# Patient Record
Sex: FEMALE | Race: WHITE | NOT HISPANIC OR LATINO
[De-identification: names, ages, dates, MRNs, and addresses within clinical notes are randomized per-mention and may not be internally consistent; named-entity substitution may affect disease eponyms.]

---

## 2021-08-19 PROBLEM — Z00.00 ENCOUNTER FOR PREVENTIVE HEALTH EXAMINATION: Status: ACTIVE | Noted: 2021-08-19

## 2021-09-01 ENCOUNTER — APPOINTMENT (OUTPATIENT)
Dept: OBGYN | Facility: CLINIC | Age: 26
End: 2021-09-01
Payer: MEDICAID

## 2021-09-01 PROCEDURE — 76830 TRANSVAGINAL US NON-OB: CPT

## 2021-09-01 PROCEDURE — 99204 OFFICE O/P NEW MOD 45 MIN: CPT | Mod: 25

## 2021-10-06 ENCOUNTER — APPOINTMENT (OUTPATIENT)
Dept: OBGYN | Facility: CLINIC | Age: 26
End: 2021-10-06
Payer: MEDICAID

## 2021-10-06 PROCEDURE — 99214 OFFICE O/P EST MOD 30 MIN: CPT | Mod: 25

## 2021-10-06 PROCEDURE — 76830 TRANSVAGINAL US NON-OB: CPT

## 2021-10-06 PROCEDURE — 87490 CHLMYD TRACH DNA DIR PROBE: CPT

## 2021-10-27 ENCOUNTER — APPOINTMENT (OUTPATIENT)
Dept: OBGYN | Facility: CLINIC | Age: 26
End: 2021-10-27
Payer: MEDICAID

## 2021-10-27 PROCEDURE — 99213 OFFICE O/P EST LOW 20 MIN: CPT | Mod: TH

## 2021-11-03 ENCOUNTER — APPOINTMENT (OUTPATIENT)
Dept: OBGYN | Facility: CLINIC | Age: 26
End: 2021-11-03
Payer: MEDICAID

## 2021-11-03 PROCEDURE — 76830 TRANSVAGINAL US NON-OB: CPT

## 2021-11-03 PROCEDURE — 99213 OFFICE O/P EST LOW 20 MIN: CPT | Mod: TH,25

## 2021-11-24 ENCOUNTER — APPOINTMENT (OUTPATIENT)
Dept: OBGYN | Facility: CLINIC | Age: 26
End: 2021-11-24
Payer: MEDICAID

## 2021-11-24 PROCEDURE — 99213 OFFICE O/P EST LOW 20 MIN: CPT | Mod: TH

## 2021-12-22 ENCOUNTER — APPOINTMENT (OUTPATIENT)
Dept: OBGYN | Facility: CLINIC | Age: 26
End: 2021-12-22
Payer: MEDICAID

## 2021-12-22 PROCEDURE — 99213 OFFICE O/P EST LOW 20 MIN: CPT | Mod: TH

## 2022-01-19 ENCOUNTER — APPOINTMENT (OUTPATIENT)
Dept: OBGYN | Facility: CLINIC | Age: 27
End: 2022-01-19
Payer: MEDICAID

## 2022-01-19 PROCEDURE — 99213 OFFICE O/P EST LOW 20 MIN: CPT | Mod: TH

## 2022-02-02 ENCOUNTER — APPOINTMENT (OUTPATIENT)
Dept: OBGYN | Facility: CLINIC | Age: 27
End: 2022-02-02
Payer: MEDICAID

## 2022-02-02 PROCEDURE — 99213 OFFICE O/P EST LOW 20 MIN: CPT | Mod: TH

## 2022-02-16 ENCOUNTER — APPOINTMENT (OUTPATIENT)
Dept: OBGYN | Facility: CLINIC | Age: 27
End: 2022-02-16
Payer: MEDICAID

## 2022-02-16 PROCEDURE — 99213 OFFICE O/P EST LOW 20 MIN: CPT | Mod: TH

## 2022-03-02 ENCOUNTER — APPOINTMENT (OUTPATIENT)
Dept: OBGYN | Facility: CLINIC | Age: 27
End: 2022-03-02
Payer: MEDICAID

## 2022-03-02 PROCEDURE — 99213 OFFICE O/P EST LOW 20 MIN: CPT | Mod: TH

## 2022-03-30 ENCOUNTER — APPOINTMENT (OUTPATIENT)
Dept: OBGYN | Facility: CLINIC | Age: 27
End: 2022-03-30
Payer: MEDICAID

## 2022-03-30 PROCEDURE — 87075 CULTR BACTERIA EXCEPT BLOOD: CPT

## 2022-03-30 PROCEDURE — 99213 OFFICE O/P EST LOW 20 MIN: CPT | Mod: TH

## 2022-04-06 ENCOUNTER — APPOINTMENT (OUTPATIENT)
Dept: OBGYN | Facility: CLINIC | Age: 27
End: 2022-04-06
Payer: MEDICAID

## 2022-04-06 PROCEDURE — 99213 OFFICE O/P EST LOW 20 MIN: CPT | Mod: TH

## 2022-04-13 ENCOUNTER — APPOINTMENT (OUTPATIENT)
Dept: OBGYN | Facility: CLINIC | Age: 27
End: 2022-04-13
Payer: MEDICAID

## 2022-04-13 PROCEDURE — 99213 OFFICE O/P EST LOW 20 MIN: CPT | Mod: TH

## 2022-04-27 ENCOUNTER — APPOINTMENT (OUTPATIENT)
Dept: OBGYN | Facility: CLINIC | Age: 27
End: 2022-04-27
Payer: MEDICAID

## 2022-04-27 PROCEDURE — 99213 OFFICE O/P EST LOW 20 MIN: CPT | Mod: TH

## 2022-05-04 ENCOUNTER — APPOINTMENT (OUTPATIENT)
Dept: OBGYN | Facility: CLINIC | Age: 27
End: 2022-05-04
Payer: MEDICAID

## 2022-05-04 PROCEDURE — 99213 OFFICE O/P EST LOW 20 MIN: CPT | Mod: TH

## 2022-05-05 ENCOUNTER — INPATIENT (INPATIENT)
Facility: HOSPITAL | Age: 27
LOS: 0 days | Discharge: HOME | End: 2022-05-06
Attending: OBSTETRICS & GYNECOLOGY | Admitting: OBSTETRICS & GYNECOLOGY
Payer: MEDICAID

## 2022-05-05 VITALS
SYSTOLIC BLOOD PRESSURE: 144 MMHG | TEMPERATURE: 98 F | DIASTOLIC BLOOD PRESSURE: 80 MMHG | HEART RATE: 90 BPM | HEIGHT: 63 IN | WEIGHT: 177.03 LBS | RESPIRATION RATE: 18 BRPM

## 2022-05-05 LAB
ABO RH CONFIRMATION: SIGNIFICANT CHANGE UP
BASOPHILS # BLD AUTO: 0.03 K/UL — SIGNIFICANT CHANGE UP (ref 0–0.2)
BASOPHILS # BLD AUTO: 0.05 K/UL — SIGNIFICANT CHANGE UP (ref 0–0.2)
BASOPHILS NFR BLD AUTO: 0.2 % — SIGNIFICANT CHANGE UP (ref 0–1)
BASOPHILS NFR BLD AUTO: 0.3 % — SIGNIFICANT CHANGE UP (ref 0–1)
BLD GP AB SCN SERPL QL: SIGNIFICANT CHANGE UP
EOSINOPHIL # BLD AUTO: 0.03 K/UL — SIGNIFICANT CHANGE UP (ref 0–0.7)
EOSINOPHIL # BLD AUTO: 0.09 K/UL — SIGNIFICANT CHANGE UP (ref 0–0.7)
EOSINOPHIL NFR BLD AUTO: 0.2 % — SIGNIFICANT CHANGE UP (ref 0–8)
EOSINOPHIL NFR BLD AUTO: 0.7 % — SIGNIFICANT CHANGE UP (ref 0–8)
HCT VFR BLD CALC: 39.2 % — SIGNIFICANT CHANGE UP (ref 37–47)
HCT VFR BLD CALC: 41.2 % — SIGNIFICANT CHANGE UP (ref 37–47)
HGB BLD-MCNC: 13 G/DL — SIGNIFICANT CHANGE UP (ref 12–16)
HGB BLD-MCNC: 13.9 G/DL — SIGNIFICANT CHANGE UP (ref 12–16)
HIV 1 & 2 AB SERPL IA.RAPID: SIGNIFICANT CHANGE UP
IMM GRANULOCYTES NFR BLD AUTO: 0.5 % — HIGH (ref 0.1–0.3)
IMM GRANULOCYTES NFR BLD AUTO: 0.6 % — HIGH (ref 0.1–0.3)
LYMPHOCYTES # BLD AUTO: 17.2 % — LOW (ref 20.5–51.1)
LYMPHOCYTES # BLD AUTO: 2.88 K/UL — SIGNIFICANT CHANGE UP (ref 1.2–3.4)
LYMPHOCYTES # BLD AUTO: 2.99 K/UL — SIGNIFICANT CHANGE UP (ref 1.2–3.4)
LYMPHOCYTES # BLD AUTO: 23.7 % — SIGNIFICANT CHANGE UP (ref 20.5–51.1)
MCHC RBC-ENTMCNC: 28.3 PG — SIGNIFICANT CHANGE UP (ref 27–31)
MCHC RBC-ENTMCNC: 28.4 PG — SIGNIFICANT CHANGE UP (ref 27–31)
MCHC RBC-ENTMCNC: 33.2 G/DL — SIGNIFICANT CHANGE UP (ref 32–37)
MCHC RBC-ENTMCNC: 33.7 G/DL — SIGNIFICANT CHANGE UP (ref 32–37)
MCV RBC AUTO: 84.3 FL — SIGNIFICANT CHANGE UP (ref 81–99)
MCV RBC AUTO: 85.4 FL — SIGNIFICANT CHANGE UP (ref 81–99)
MONOCYTES # BLD AUTO: 0.87 K/UL — HIGH (ref 0.1–0.6)
MONOCYTES # BLD AUTO: 1.12 K/UL — HIGH (ref 0.1–0.6)
MONOCYTES NFR BLD AUTO: 6.7 % — SIGNIFICANT CHANGE UP (ref 1.7–9.3)
MONOCYTES NFR BLD AUTO: 6.9 % — SIGNIFICANT CHANGE UP (ref 1.7–9.3)
NEUTROPHILS # BLD AUTO: 12.59 K/UL — HIGH (ref 1.4–6.5)
NEUTROPHILS # BLD AUTO: 8.57 K/UL — HIGH (ref 1.4–6.5)
NEUTROPHILS NFR BLD AUTO: 67.9 % — SIGNIFICANT CHANGE UP (ref 42.2–75.2)
NEUTROPHILS NFR BLD AUTO: 75.1 % — SIGNIFICANT CHANGE UP (ref 42.2–75.2)
NRBC # BLD: 0 /100 WBCS — SIGNIFICANT CHANGE UP (ref 0–0)
NRBC # BLD: 0 /100 WBCS — SIGNIFICANT CHANGE UP (ref 0–0)
PLATELET # BLD AUTO: 167 K/UL — SIGNIFICANT CHANGE UP (ref 130–400)
PLATELET # BLD AUTO: 179 K/UL — SIGNIFICANT CHANGE UP (ref 130–400)
PRENATAL SYPHILIS TEST: SIGNIFICANT CHANGE UP
RBC # BLD: 4.59 M/UL — SIGNIFICANT CHANGE UP (ref 4.2–5.4)
RBC # BLD: 4.89 M/UL — SIGNIFICANT CHANGE UP (ref 4.2–5.4)
RBC # FLD: 13.5 % — SIGNIFICANT CHANGE UP (ref 11.5–14.5)
RBC # FLD: 13.6 % — SIGNIFICANT CHANGE UP (ref 11.5–14.5)
SARS-COV-2 RNA SPEC QL NAA+PROBE: SIGNIFICANT CHANGE UP
WBC # BLD: 12.63 K/UL — HIGH (ref 4.8–10.8)
WBC # BLD: 16.75 K/UL — HIGH (ref 4.8–10.8)
WBC # FLD AUTO: 12.63 K/UL — HIGH (ref 4.8–10.8)
WBC # FLD AUTO: 16.75 K/UL — HIGH (ref 4.8–10.8)

## 2022-05-05 PROCEDURE — ZZZZZ: CPT

## 2022-05-05 PROCEDURE — 59409 OBSTETRICAL CARE: CPT | Mod: U9

## 2022-05-05 RX ORDER — BENZOCAINE 10 %
1 GEL (GRAM) MUCOUS MEMBRANE EVERY 6 HOURS
Refills: 0 | Status: DISCONTINUED | OUTPATIENT
Start: 2022-05-05 | End: 2022-05-06

## 2022-05-05 RX ORDER — AMPICILLIN TRIHYDRATE 250 MG
1 CAPSULE ORAL EVERY 4 HOURS
Refills: 0 | Status: DISCONTINUED | OUTPATIENT
Start: 2022-05-05 | End: 2022-05-05

## 2022-05-05 RX ORDER — OXYTOCIN 10 UNIT/ML
333.33 VIAL (ML) INJECTION
Qty: 20 | Refills: 0 | Status: DISCONTINUED | OUTPATIENT
Start: 2022-05-05 | End: 2022-05-06

## 2022-05-05 RX ORDER — MAGNESIUM HYDROXIDE 400 MG/1
30 TABLET, CHEWABLE ORAL
Refills: 0 | Status: DISCONTINUED | OUTPATIENT
Start: 2022-05-05 | End: 2022-05-06

## 2022-05-05 RX ORDER — TETANUS TOXOID, REDUCED DIPHTHERIA TOXOID AND ACELLULAR PERTUSSIS VACCINE, ADSORBED 5; 2.5; 8; 8; 2.5 [IU]/.5ML; [IU]/.5ML; UG/.5ML; UG/.5ML; UG/.5ML
0.5 SUSPENSION INTRAMUSCULAR ONCE
Refills: 0 | Status: DISCONTINUED | OUTPATIENT
Start: 2022-05-05 | End: 2022-05-06

## 2022-05-05 RX ORDER — LANOLIN
1 OINTMENT (GRAM) TOPICAL EVERY 6 HOURS
Refills: 0 | Status: DISCONTINUED | OUTPATIENT
Start: 2022-05-05 | End: 2022-05-06

## 2022-05-05 RX ORDER — SODIUM CHLORIDE 9 MG/ML
3 INJECTION INTRAMUSCULAR; INTRAVENOUS; SUBCUTANEOUS EVERY 8 HOURS
Refills: 0 | Status: DISCONTINUED | OUTPATIENT
Start: 2022-05-05 | End: 2022-05-06

## 2022-05-05 RX ORDER — IBUPROFEN 200 MG
600 TABLET ORAL EVERY 6 HOURS
Refills: 0 | Status: COMPLETED | OUTPATIENT
Start: 2022-05-05 | End: 2023-04-03

## 2022-05-05 RX ORDER — SODIUM CHLORIDE 9 MG/ML
1000 INJECTION, SOLUTION INTRAVENOUS
Refills: 0 | Status: DISCONTINUED | OUTPATIENT
Start: 2022-05-05 | End: 2022-05-05

## 2022-05-05 RX ORDER — IBUPROFEN 200 MG
600 TABLET ORAL EVERY 6 HOURS
Refills: 0 | Status: DISCONTINUED | OUTPATIENT
Start: 2022-05-05 | End: 2022-05-06

## 2022-05-05 RX ORDER — AMPICILLIN TRIHYDRATE 250 MG
2 CAPSULE ORAL ONCE
Refills: 0 | Status: COMPLETED | OUTPATIENT
Start: 2022-05-05 | End: 2022-05-05

## 2022-05-05 RX ORDER — SIMETHICONE 80 MG/1
80 TABLET, CHEWABLE ORAL EVERY 4 HOURS
Refills: 0 | Status: DISCONTINUED | OUTPATIENT
Start: 2022-05-05 | End: 2022-05-06

## 2022-05-05 RX ORDER — AER TRAVELER 0.5 G/1
1 SOLUTION RECTAL; TOPICAL EVERY 4 HOURS
Refills: 0 | Status: DISCONTINUED | OUTPATIENT
Start: 2022-05-05 | End: 2022-05-06

## 2022-05-05 RX ORDER — PRAMOXINE HYDROCHLORIDE 150 MG/15G
1 AEROSOL, FOAM RECTAL EVERY 4 HOURS
Refills: 0 | Status: DISCONTINUED | OUTPATIENT
Start: 2022-05-05 | End: 2022-05-06

## 2022-05-05 RX ORDER — DIPHENHYDRAMINE HCL 50 MG
25 CAPSULE ORAL EVERY 6 HOURS
Refills: 0 | Status: DISCONTINUED | OUTPATIENT
Start: 2022-05-05 | End: 2022-05-06

## 2022-05-05 RX ORDER — OXYTOCIN 10 UNIT/ML
333.33 VIAL (ML) INJECTION
Qty: 20 | Refills: 0 | Status: DISCONTINUED | OUTPATIENT
Start: 2022-05-05 | End: 2022-05-05

## 2022-05-05 RX ORDER — OXYCODONE HYDROCHLORIDE 5 MG/1
5 TABLET ORAL ONCE
Refills: 0 | Status: DISCONTINUED | OUTPATIENT
Start: 2022-05-05 | End: 2022-05-06

## 2022-05-05 RX ORDER — OXYCODONE HYDROCHLORIDE 5 MG/1
5 TABLET ORAL
Refills: 0 | Status: DISCONTINUED | OUTPATIENT
Start: 2022-05-05 | End: 2022-05-06

## 2022-05-05 RX ORDER — ACETAMINOPHEN 500 MG
975 TABLET ORAL
Refills: 0 | Status: DISCONTINUED | OUTPATIENT
Start: 2022-05-05 | End: 2022-05-06

## 2022-05-05 RX ORDER — HYDROCORTISONE 1 %
1 OINTMENT (GRAM) TOPICAL EVERY 6 HOURS
Refills: 0 | Status: DISCONTINUED | OUTPATIENT
Start: 2022-05-05 | End: 2022-05-06

## 2022-05-05 RX ORDER — DIBUCAINE 1 %
1 OINTMENT (GRAM) RECTAL EVERY 6 HOURS
Refills: 0 | Status: DISCONTINUED | OUTPATIENT
Start: 2022-05-05 | End: 2022-05-06

## 2022-05-05 RX ORDER — KETOROLAC TROMETHAMINE 30 MG/ML
30 SYRINGE (ML) INJECTION ONCE
Refills: 0 | Status: DISCONTINUED | OUTPATIENT
Start: 2022-05-05 | End: 2022-05-05

## 2022-05-05 RX ADMIN — Medication 200 GRAM(S): at 04:52

## 2022-05-05 RX ADMIN — SODIUM CHLORIDE 125 MILLILITER(S): 9 INJECTION, SOLUTION INTRAVENOUS at 04:59

## 2022-05-05 RX ADMIN — SODIUM CHLORIDE 3 MILLILITER(S): 9 INJECTION INTRAMUSCULAR; INTRAVENOUS; SUBCUTANEOUS at 21:12

## 2022-05-05 RX ADMIN — Medication 30 MILLIGRAM(S): at 07:46

## 2022-05-05 RX ADMIN — Medication 975 MILLIGRAM(S): at 20:34

## 2022-05-05 RX ADMIN — Medication 1 TABLET(S): at 12:43

## 2022-05-05 RX ADMIN — Medication 975 MILLIGRAM(S): at 21:12

## 2022-05-05 RX ADMIN — Medication 30 MILLIGRAM(S): at 07:25

## 2022-05-05 RX ADMIN — SODIUM CHLORIDE 3 MILLILITER(S): 9 INJECTION INTRAMUSCULAR; INTRAVENOUS; SUBCUTANEOUS at 16:41

## 2022-05-05 NOTE — OB PROVIDER H&P - HISTORY OF PRESENT ILLNESS
27yo  at 41w4d dated by LMP c/w first trimester sono with MIGUEL ÁNGEL 22 presenting due to ctx. Reports they are every minute, rated 7-8/10. Denies LOF,VB. GooD FM. Denies complications this pregnancy. GBS pos.

## 2022-05-05 NOTE — OB RN DELIVERY SUMMARY - NSSELHIDDEN_OBGYN_ALL_OB_FT
[NS_DeliveryAttending1_OBGYN_ALL_OB_FT:[email or web address]=],[NS_DeliveryAssist1_OBGYN_ALL_OB_FT:[id number]=],[NS_DeliveryRN_OBGYN_ALL_OB_FT:[id number]=],[NS_CirculateRN2_OBGYN_ALL_OB_FT:[email or web address]=]

## 2022-05-05 NOTE — OB PROVIDER H&P - ASSESSMENT
26y  at 41w4d, GBS pos, in labor at term,  -Admit to L+D  -Monitor EFM and TOCO   -IVF and labs  -Pain control PRN  -Clear liquid diet as tolerated  -Ampicillin for GBS ppx    Dr. Goodson and Dr. Buck aware

## 2022-05-05 NOTE — OB RN PATIENT PROFILE - PRO RUBELLA INFANT
Patient Education    BRIGHT FUTURES HANDOUT- PARENT  4 MONTH VISIT  Here are some suggestions from [employer] experts that may be of value to your family.     HOW YOUR FAMILY IS DOING  Learn if your home or drinking water has lead and take steps to get rid of it. Lead is toxic for everyone.  Take time for yourself and with your partner. Spend time with family and friends.  Choose a mature, trained, and responsible  or caregiver.  You can talk with us about your  choices.    FEEDING YOUR BABY    For babies at 4 months of age, breast milk or iron-fortified formula remains the best food. Solid foods are discouraged until about 6 months of age.    Avoid feeding your baby too much by following the baby s signs of fullness, such as  Leaning back  Turning away  If Breastfeeding  Providing only breast milk for your baby for about the first 6 months after birth provides ideal nutrition. It supports the best possible growth and development.  Be proud of yourself if you are still breastfeeding. Continue as long as you and your baby want.  Know that babies this age go through growth spurts. They may want to breastfeed more often and that is normal.  If you pump, be sure to store your milk properly so it stays safe for your baby. We can give you more information.  Give your baby vitamin D drops (400 IU a day).  Tell us if you are taking any medications, supplements, or herbal preparations.  If Formula Feeding  Make sure to prepare, heat, and store the formula safely.  Feed on demand. Expect him to eat about 30 to 32 oz daily.  Hold your baby so you can look at each other when you feed him.  Always hold the bottle. Never prop it.  Don t give your baby a bottle while he is in a crib.    YOUR CHANGING BABY    Create routines for feeding, nap time, and bedtime.    Calm your baby with soothing and gentle touches when she is fussy.    Make time for quiet play.    Hold your baby and talk with her.    Read to  your baby often.    Encourage active play.    Offer floor gyms and colorful toys to hold.    Put your baby on her tummy for playtime. Don t leave her alone during tummy time or allow her to sleep on her tummy.    Don t have a TV on in the background or use a TV or other digital media to calm your baby.    HEALTHY TEETH    Go to your own dentist twice yearly. It is important to keep your teeth healthy so you don t pass bacteria that cause cavities on to your baby.    Don t share spoons with your baby or use your mouth to clean the baby s pacifier.    Use a cold teething ring if your baby s gums are sore from teething.    Don t put your baby in a crib with a bottle.    Clean your baby s gums and teeth (as soon as you see the first tooth) 2 times per day with a soft cloth or soft toothbrush and a small smear of fluoride toothpaste (no more than a grain of rice).    SAFETY  Use a rear-facing-only car safety seat in the back seat of all vehicles.  Never put your baby in the front seat of a vehicle that has a passenger airbag.  Your baby s safety depends on you. Always wear your lap and shoulder seat belt. Never drive after drinking alcohol or using drugs. Never text or use a cell phone while driving.  Always put your baby to sleep on her back in her own crib, not in your bed.  Your baby should sleep in your room until she is at least 6 months of age.  Make sure your baby s crib or sleep surface meets the most recent safety guidelines.  Don t put soft objects and loose bedding such as blankets, pillows, bumper pads, and toys in the crib.    Drop-side cribs should not be used.    Lower the crib mattress.    If you choose to use a mesh playpen, get one made after February 28, 2013.    Prevent tap water burns. Set the water heater so the temperature at the faucet is at or below 120 F /49 C.    Prevent scalds or burns. Don t drink hot drinks when holding your baby.    Keep a hand on your baby on any surface from which she  might fall and get hurt, such as a changing table, couch, or bed.    Never leave your baby alone in bathwater, even in a bath seat or ring.    Keep small objects, small toys, and latex balloons away from your baby.    Don t use a baby walker.    WHAT TO EXPECT AT YOUR BABY S 6 MONTH VISIT  We will talk about  Caring for your baby, your family, and yourself  Teaching and playing with your baby  Brushing your baby s teeth  Introducing solid food    Keeping your baby safe at home, outside, and in the car        Helpful Resources:  Information About Car Safety Seats: www.safercar.gov/parents  Toll-free Auto Safety Hotline: 523.890.3545  Consistent with Bright Futures: Guidelines for Health Supervision of Infants, Children, and Adolescents, 4th Edition  For more information, go to https://brightfutures.aap.org.                  nonimmune

## 2022-05-05 NOTE — OB RN PATIENT PROFILE - PRO PRENATAL LABS ORI SOURCE ANTIBODY SCREEN
I will STOP taking the medications listed below when I get home from the hospital:  None
hard copy, drawn during this pregnancy

## 2022-05-05 NOTE — OB PROVIDER DELIVERY SUMMARY - NSSELHIDDEN_OBGYN_ALL_OB_FT
[NS_DeliveryAttending1_OBGYN_ALL_OB_FT:[email or web address]=],[NS_DeliveryAssist1_OBGYN_ALL_OB_FT:[id number]=],[NS_DeliveryRN_OBGYN_ALL_OB_FT:[id number]=],[NS_CirculateRN2_OBGYN_ALL_OB_FT:[email or web address]=]

## 2022-05-05 NOTE — OB PROVIDER DELIVERY SUMMARY - NSPROVIDERDELIVERYNOTE_OBGYN_ALL_OB_FT
Patient was fully dilated and pushing. Head delivered OA, restituted to MARKIE, nuchal cordx1 easily reduced, Anterior shoulder delivered, followed by the posterior shoulder, rest of the body without complications. Baby suctioned, cord clamped and cut, and infant placed on mothers abdomen. Cord segment and blood obtained. Placenta delivered, intact. Fundus massaged and firm, with good hemostasis. Pitocin given postpartum. Vaginal vault, perineum, and cervix inspected, and small first degree perineal tear noted, hemostatic, not requiring repair. Live female infant delivered.     Dr. Buck present at bedside

## 2022-05-05 NOTE — OB RN PATIENT PROFILE - FALL HARM RISK - UNIVERSAL INTERVENTIONS
Bed in lowest position, wheels locked, appropriate side rails in place/Call bell, personal items and telephone in reach/Instruct patient to call for assistance before getting out of bed or chair/Non-slip footwear when patient is out of bed/Platter to call system/Physically safe environment - no spills, clutter or unnecessary equipment/Purposeful Proactive Rounding/Room/bathroom lighting operational, light cord in reach

## 2022-05-05 NOTE — OB PROVIDER H&P - NSHPPHYSICALEXAM_GEN_ALL_CORE
Gen: A+OX3. NAD  Abd: Soft, Nontender. Gravid. Palpable contractions  SVE: 5/80/-2, vtx, intact    FHR: 145BPM/mod olivia   TOCO: q3min      EFW by Leopolds: 3700

## 2022-05-05 NOTE — OB PROVIDER H&P - NS_FHRACCEL_OBGYN_ALL_OB
Health Maintenance Due   Topic Date Due   • COVID-19 Vaccine (1) Never done   • Influenza Vaccine (1) Never done       Patient is due for topics as listed above but is not proceeding with Immunization(s) COVID-19 and Influenza at this time.            Present (15 x15 bpm)

## 2022-05-05 NOTE — OB PROVIDER H&P - NS_OBGYNHISTORY_OBGYN_ALL_OB_FT
OB: FT NSVDx1 8lb5oz    GYN: denies history of fibroids, ovarian cysts, abnormal papsmears, and STIs

## 2022-05-06 ENCOUNTER — TRANSCRIPTION ENCOUNTER (OUTPATIENT)
Age: 27
End: 2022-05-06

## 2022-05-06 VITALS — SYSTOLIC BLOOD PRESSURE: 155 MMHG | DIASTOLIC BLOOD PRESSURE: 74 MMHG

## 2022-05-06 PROCEDURE — 99238 HOSP IP/OBS DSCHRG MGMT 30/<: CPT

## 2022-05-06 RX ORDER — IBUPROFEN 200 MG
1 TABLET ORAL
Qty: 0 | Refills: 0 | DISCHARGE
Start: 2022-05-06

## 2022-05-06 RX ORDER — ACETAMINOPHEN 500 MG
3 TABLET ORAL
Qty: 0 | Refills: 0 | DISCHARGE
Start: 2022-05-06

## 2022-05-06 RX ADMIN — SODIUM CHLORIDE 3 MILLILITER(S): 9 INJECTION INTRAMUSCULAR; INTRAVENOUS; SUBCUTANEOUS at 06:06

## 2022-05-06 NOTE — DISCHARGE NOTE OB - NS AS DC STROKE DX YN
[FreeTextEntry8] : Pt also c/o dysuria x 1 week. Pt also having abdominal pain. Pt having intermittent chills.\par Pt c/o maxillary sinus pressure/headaches and cough productive for past 1.5 weeks. Pt taking sudafed which hasn't helped.\par Reviewed recent labs w/ pt. DM uncontrolled, pt to start januvia. Pt does c/o abd pains at times. Denies CP, palpitations, dyspnea, n/v
no

## 2022-05-06 NOTE — PROGRESS NOTE ADULT - ATTENDING COMMENTS
Patient seen, case discussed with resident  PPD#1  doing well.  Occasional elevated BP, patient asymptomatic. Precautions discussed.   Stable for d/c home.   F/u 6 weeks for postpartum visit.  Precautions discussed. Patient seen, case discussed with resident  PPD#1  doing well.  Occasional elevated BP - likely related to anxiety, patient asymptomatic. Patient has BP machine at home and will continue to monitor. Precautions discussed in detail.   Stable for d/c home.   F/u 1 week for BP check with Dr Fang and 6 weeks for postpartum visit.  Precautions discussed.

## 2022-05-06 NOTE — DISCHARGE NOTE OB - CARE PROVIDER_API CALL
Shane Fang)  Obstetrics and Gynecology  [address] [place] [address]  Phone: [phone]  Fax: [phone]  Follow Up Time:

## 2022-05-06 NOTE — PROGRESS NOTE ADULT - ASSESSMENT
27yo P2 s/p  PPD#1 , doing well    - Continue routine postpartum care  - Pain management prn  - Encourage ambulation, oral hydration  - Monitor vitals and bleeding  - MMR ordered  - anticipated d/c home today    Dr. Goodson and OBGYN attending to be made aware

## 2022-05-06 NOTE — DISCHARGE NOTE OB - PATIENT PORTAL LINK FT
You can access the FollowMyHealth Patient Portal offered by [facility] by registering at the following website: [email or web address]. By joining [employer]’s FollowMyHealth portal, you will also be able to view your health information using other applications (apps) compatible with our system.

## 2022-05-06 NOTE — PROGRESS NOTE ADULT - SUBJECTIVE AND OBJECTIVE BOX
pt underwent a   girl  no complications.
PGY 1 Note:    Pt doing well, pain well controlled. Denies heavy vaginal bleeding. No overnight events, no acute complaints. Ambulating without difficulty, voiding, and tolerating regular diet. Breastfeeding.    PAST MEDICAL & SURGICAL HISTORY:  No pertinent past medical history    No significant past surgical history        Physical Exam  Vital Signs Last 24 Hrs  T(F): 98.7 (05 May 2022 08:22), Max: 98.7 (05 May 2022 08:22)  HR: 83 (05 May 2022 08:22) (50 - 85)  BP: 133/74 (05 May 2022 08:22) (105/76 - 141/64)  RR: 18 (05 May 2022 08:22) (18 - 18)    Gen: AAOx3, NAD  Abd: Soft, nontender, nondistended  Fundus: Firm, nontender, below the umbilicus  Lochia: Minimal  Ext: No calf tenderness, no swelling    Labs:                        13.0   16.75 )-----------( 179      ( 05 May 2022 18:43 )             39.2                         13.9   12.63 )-----------( 167      ( 05 May 2022 04:50 )             41.2

## 2022-05-06 NOTE — DISCHARGE NOTE OB - HOSPITAL COURSE
DATE OF DISCHARGE: 22 @ 05:51    HISTORY OF PRESENT ILLNESS/HOSPITAL COURSE: HPI:  27yo  at 41w4d dated by LMP c/w first trimester sono with MIGUEL ÁNGEL 22 presenting due to ctx. Reports they are every minute, rated 7-8/10. Denies LOF,VB. GooD FM. Denies complications this pregnancy. GBS pos.  (05 May 2022 04:51)    PAST MEDICAL & SURGICAL HISTORY:  No pertinent past medical history    No significant past surgical history        PROCEDURES PERFORMED: Term spontaneous vaginal delivery  POST PARTUM COURSE: uncomplicated, discharged home on PPD1  FINAL DIAGNOSIS:  Status post normal spontaneous vaginal delivery at Gestational Age    DISCHARGE CBC:                       13.0   16.75 )-----------( 179      ( 05 May 2022 18:43 )             39.2     DISCHARGE INSTRUCTIONS:  If you have severe abdominal pain, heavy vaginal bleeding, shortness of breath or chest pain please call your doctor or come to the emergency room.   DIET:  Advance as tolerated.  ACTIVITY:  Advance as tolerated.  Pelvic rest for 6 weeks.  Nothing to be inserted into the vagina for 6 weeks, i.e. no tampons, douching, sexual relations, or tub baths.   FOLLOW UP: Make an appointment to see your doctor as instructed   PRESCRIPTIONS: Prescriptions:

## 2022-05-10 DIAGNOSIS — Z3A.41 41 WEEKS GESTATION OF PREGNANCY: ICD-10-CM

## 2022-05-31 PROBLEM — Z78.9 OTHER SPECIFIED HEALTH STATUS: Chronic | Status: ACTIVE | Noted: 2022-05-05

## 2022-06-15 ENCOUNTER — APPOINTMENT (OUTPATIENT)
Dept: OBGYN | Facility: CLINIC | Age: 27
End: 2022-06-15
Payer: MEDICAID

## 2022-06-15 PROCEDURE — 87490 CHLMYD TRACH DNA DIR PROBE: CPT

## 2023-06-07 ENCOUNTER — APPOINTMENT (OUTPATIENT)
Dept: OBGYN | Facility: CLINIC | Age: 28
End: 2023-06-07
Payer: MEDICAID

## 2023-06-07 PROCEDURE — 99214 OFFICE O/P EST MOD 30 MIN: CPT | Mod: TH

## 2023-06-07 NOTE — HISTORY OF PRESENT ILLNESS
[FreeTextEntry1] : ---- 29 Y/O  LMP 23 EDC 24 EGA 7 WEEKS\par PMHX;/\par PSHX;/\par SOCIAL HX;-ETOH -CIGG \par STD;   /        STD PREVENTION DISCUSSED\par FAMILY HISTORY OF BREAST CANCER\par REVIEW OF SYMPTOMS DONE;\par ALLERGIES; pt answered NKDA\par Medication reconciliation was completed by reviewing, with the patient's\par involvement, the patient's current outpatient medications and those \par ordered for the patient today. \par         \par PE;\par ABDOMEN;SOFT NT ND\par NL GENITALIA\par VAGINA -DC\par CERVIX;-CMT\par UTERUS; TOP NL SIZE NT AV\par ADNEXA; NT - MASSES\par \par TV SONO;THICKENED LINING NOTED - MASSES -FF\par \par A;P;EARLY PREGNANCY\par -OFFICIAL SONO\par -ER INSTRUCTIONS REVIEWED\par -RTC 1 WEEK\par

## 2023-06-14 ENCOUNTER — APPOINTMENT (OUTPATIENT)
Dept: OBGYN | Facility: CLINIC | Age: 28
End: 2023-06-14
Payer: MEDICAID

## 2023-06-14 DIAGNOSIS — N91.2 AMENORRHEA, UNSPECIFIED: ICD-10-CM

## 2023-06-14 PROCEDURE — 99213 OFFICE O/P EST LOW 20 MIN: CPT

## 2023-06-14 NOTE — HISTORY OF PRESENT ILLNESS
[FreeTextEntry1] : ---- 27 Y/O  LMP 23 HERE FOR F-U;SONO LAST WEEK ONLY SHOWED THICKENED LINING;\par PMHX;/\par PSHX;/\par SOCIAL HX;-ETOH -CIGG \par STD;   /        STD PREVENTION DISCUSSED\par FAMILY HISTORY OF BREAST CANCER\par REVIEW OF SYMPTOMS DONE;\par ALLERGIES; pt answered NKDA\par Medication reconciliation was completed by reviewing, with the patient's\par involvement, the patient's current outpatient medications and those \par ordered for the patient today. \par         \par PE;\par ABDOMEN;SOFT NT ND\par NL GENITALIA\par VAGINA -DC\par CERVIX;-CMT\par UTERUS; TOP NL SIZE NT AV\par ADNEXA; NT - MASSES\par \par TV SONO;IUP GEST SAC YS; NO FP - MASSES -FF\par \par A;P;EARLY PREGNANCY\par -OFFICIAL SONO\par -ER INSTRUCTIONS REVIEWED\par -RTC 3 WEEK\par

## 2023-06-22 ENCOUNTER — NON-APPOINTMENT (OUTPATIENT)
Age: 28
End: 2023-06-22

## 2023-06-23 ENCOUNTER — NON-APPOINTMENT (OUTPATIENT)
Age: 28
End: 2023-06-23

## 2023-06-29 ENCOUNTER — NON-APPOINTMENT (OUTPATIENT)
Age: 28
End: 2023-06-29

## 2023-09-05 ENCOUNTER — NON-APPOINTMENT (OUTPATIENT)
Age: 28
End: 2023-09-05

## 2023-09-06 ENCOUNTER — APPOINTMENT (OUTPATIENT)
Dept: OBGYN | Facility: CLINIC | Age: 28
End: 2023-09-06
Payer: MEDICAID

## 2023-09-06 PROCEDURE — 99213 OFFICE O/P EST LOW 20 MIN: CPT | Mod: TH

## 2023-09-27 ENCOUNTER — APPOINTMENT (OUTPATIENT)
Dept: OBGYN | Facility: CLINIC | Age: 28
End: 2023-09-27
Payer: MEDICAID

## 2023-09-27 PROCEDURE — 99213 OFFICE O/P EST LOW 20 MIN: CPT | Mod: TH

## 2023-10-04 ENCOUNTER — APPOINTMENT (OUTPATIENT)
Dept: OBGYN | Facility: CLINIC | Age: 28
End: 2023-10-04

## 2023-10-25 ENCOUNTER — APPOINTMENT (OUTPATIENT)
Dept: OBGYN | Facility: CLINIC | Age: 28
End: 2023-10-25
Payer: MEDICAID

## 2023-10-25 PROCEDURE — 99213 OFFICE O/P EST LOW 20 MIN: CPT | Mod: TH

## 2023-10-26 ENCOUNTER — NON-APPOINTMENT (OUTPATIENT)
Age: 28
End: 2023-10-26

## 2023-11-29 ENCOUNTER — APPOINTMENT (OUTPATIENT)
Dept: OBGYN | Facility: CLINIC | Age: 28
End: 2023-11-29
Payer: MEDICAID

## 2023-11-29 PROCEDURE — 99213 OFFICE O/P EST LOW 20 MIN: CPT | Mod: TH

## 2023-12-13 ENCOUNTER — APPOINTMENT (OUTPATIENT)
Dept: OBGYN | Facility: CLINIC | Age: 28
End: 2023-12-13
Payer: MEDICAID

## 2023-12-13 PROCEDURE — 99213 OFFICE O/P EST LOW 20 MIN: CPT | Mod: TH

## 2023-12-27 ENCOUNTER — APPOINTMENT (OUTPATIENT)
Dept: OBGYN | Facility: CLINIC | Age: 28
End: 2023-12-27
Payer: MEDICAID

## 2023-12-27 PROCEDURE — 99213 OFFICE O/P EST LOW 20 MIN: CPT | Mod: TH

## 2024-01-10 ENCOUNTER — APPOINTMENT (OUTPATIENT)
Dept: OBGYN | Facility: CLINIC | Age: 29
End: 2024-01-10
Payer: MEDICAID

## 2024-01-10 PROCEDURE — 99213 OFFICE O/P EST LOW 20 MIN: CPT | Mod: TH

## 2024-01-17 ENCOUNTER — APPOINTMENT (OUTPATIENT)
Dept: OBGYN | Facility: CLINIC | Age: 29
End: 2024-01-17

## 2024-01-26 ENCOUNTER — NON-APPOINTMENT (OUTPATIENT)
Age: 29
End: 2024-01-26

## 2024-01-29 ENCOUNTER — INPATIENT (INPATIENT)
Facility: HOSPITAL | Age: 29
LOS: 1 days | Discharge: HOME | DRG: 560 | End: 2024-01-31
Attending: OBSTETRICS & GYNECOLOGY | Admitting: OBSTETRICS & GYNECOLOGY
Payer: MEDICAID

## 2024-01-29 VITALS — DIASTOLIC BLOOD PRESSURE: 86 MMHG | HEART RATE: 96 BPM | SYSTOLIC BLOOD PRESSURE: 125 MMHG

## 2024-01-29 DIAGNOSIS — O61.0 FAILED MEDICAL INDUCTION OF LABOR: ICD-10-CM

## 2024-01-29 LAB
AMPHET UR-MCNC: NEGATIVE — SIGNIFICANT CHANGE UP
APPEARANCE UR: CLEAR — SIGNIFICANT CHANGE UP
BACTERIA # UR AUTO: NEGATIVE /HPF — SIGNIFICANT CHANGE UP
BARBITURATES UR SCN-MCNC: NEGATIVE — SIGNIFICANT CHANGE UP
BASOPHILS # BLD AUTO: 0.01 K/UL — SIGNIFICANT CHANGE UP (ref 0–0.2)
BASOPHILS NFR BLD AUTO: 0.1 % — SIGNIFICANT CHANGE UP (ref 0–1)
BENZODIAZ UR-MCNC: NEGATIVE — SIGNIFICANT CHANGE UP
BILIRUB UR-MCNC: NEGATIVE — SIGNIFICANT CHANGE UP
BLD GP AB SCN SERPL QL: SIGNIFICANT CHANGE UP
BUPRENORPHINE SCREEN, URINE RESULT: NEGATIVE — SIGNIFICANT CHANGE UP
CAST: 1 /LPF — SIGNIFICANT CHANGE UP (ref 0–4)
COCAINE METAB.OTHER UR-MCNC: NEGATIVE — SIGNIFICANT CHANGE UP
COLOR SPEC: YELLOW — SIGNIFICANT CHANGE UP
DIFF PNL FLD: NEGATIVE — SIGNIFICANT CHANGE UP
EOSINOPHIL # BLD AUTO: 0.07 K/UL — SIGNIFICANT CHANGE UP (ref 0–0.7)
EOSINOPHIL NFR BLD AUTO: 0.7 % — SIGNIFICANT CHANGE UP (ref 0–8)
FENTANYL UR QL: NEGATIVE — SIGNIFICANT CHANGE UP
GLUCOSE UR QL: 250 MG/DL
HCT VFR BLD CALC: 39.1 % — SIGNIFICANT CHANGE UP (ref 37–47)
HGB BLD-MCNC: 13.4 G/DL — SIGNIFICANT CHANGE UP (ref 12–16)
HIV 1 & 2 AB SERPL IA.RAPID: SIGNIFICANT CHANGE UP
IMM GRANULOCYTES NFR BLD AUTO: 0.9 % — HIGH (ref 0.1–0.3)
KETONES UR-MCNC: NEGATIVE MG/DL — SIGNIFICANT CHANGE UP
L&D DRUG SCREEN, URINE: SIGNIFICANT CHANGE UP
LEUKOCYTE ESTERASE UR-ACNC: ABNORMAL
LYMPHOCYTES # BLD AUTO: 2.19 K/UL — SIGNIFICANT CHANGE UP (ref 1.2–3.4)
LYMPHOCYTES # BLD AUTO: 20.7 % — SIGNIFICANT CHANGE UP (ref 20.5–51.1)
MCHC RBC-ENTMCNC: 29.4 PG — SIGNIFICANT CHANGE UP (ref 27–31)
MCHC RBC-ENTMCNC: 34.3 G/DL — SIGNIFICANT CHANGE UP (ref 32–37)
MCV RBC AUTO: 85.7 FL — SIGNIFICANT CHANGE UP (ref 81–99)
METHADONE UR-MCNC: NEGATIVE — SIGNIFICANT CHANGE UP
MONOCYTES # BLD AUTO: 0.48 K/UL — SIGNIFICANT CHANGE UP (ref 0.1–0.6)
MONOCYTES NFR BLD AUTO: 4.5 % — SIGNIFICANT CHANGE UP (ref 1.7–9.3)
NEUTROPHILS # BLD AUTO: 7.74 K/UL — HIGH (ref 1.4–6.5)
NEUTROPHILS NFR BLD AUTO: 73.1 % — SIGNIFICANT CHANGE UP (ref 42.2–75.2)
NITRITE UR-MCNC: NEGATIVE — SIGNIFICANT CHANGE UP
NRBC # BLD: 0 /100 WBCS — SIGNIFICANT CHANGE UP (ref 0–0)
OPIATES UR-MCNC: NEGATIVE — SIGNIFICANT CHANGE UP
OXYCODONE UR-MCNC: NEGATIVE — SIGNIFICANT CHANGE UP
PCP UR-MCNC: NEGATIVE — SIGNIFICANT CHANGE UP
PH UR: 7 — SIGNIFICANT CHANGE UP (ref 5–8)
PLATELET # BLD AUTO: 202 K/UL — SIGNIFICANT CHANGE UP (ref 130–400)
PMV BLD: 11 FL — HIGH (ref 7.4–10.4)
PRENATAL SYPHILIS TEST: SIGNIFICANT CHANGE UP
PROPOXYPHENE QUALITATIVE URINE RESULT: NEGATIVE — SIGNIFICANT CHANGE UP
PROT UR-MCNC: NEGATIVE MG/DL — SIGNIFICANT CHANGE UP
RBC # BLD: 4.56 M/UL — SIGNIFICANT CHANGE UP (ref 4.2–5.4)
RBC # FLD: 14 % — SIGNIFICANT CHANGE UP (ref 11.5–14.5)
RBC CASTS # UR COMP ASSIST: 0 /HPF — SIGNIFICANT CHANGE UP (ref 0–4)
SP GR SPEC: 1.01 — SIGNIFICANT CHANGE UP (ref 1–1.03)
SQUAMOUS # UR AUTO: 15 /HPF — HIGH (ref 0–5)
UROBILINOGEN FLD QL: 0.2 MG/DL — SIGNIFICANT CHANGE UP (ref 0.2–1)
WBC # BLD: 10.59 K/UL — SIGNIFICANT CHANGE UP (ref 4.8–10.8)
WBC # FLD AUTO: 10.59 K/UL — SIGNIFICANT CHANGE UP (ref 4.8–10.8)
WBC UR QL: 2 /HPF — SIGNIFICANT CHANGE UP (ref 0–5)

## 2024-01-29 PROCEDURE — 86645 CMV ANTIBODY IGM: CPT

## 2024-01-29 PROCEDURE — 86850 RBC ANTIBODY SCREEN: CPT

## 2024-01-29 PROCEDURE — 86900 BLOOD TYPING SEROLOGIC ABO: CPT

## 2024-01-29 PROCEDURE — 86778 TOXOPLASMA ANTIBODY IGM: CPT

## 2024-01-29 PROCEDURE — 87340 HEPATITIS B SURFACE AG IA: CPT

## 2024-01-29 PROCEDURE — 59409 OBSTETRICAL CARE: CPT | Mod: U9

## 2024-01-29 PROCEDURE — 81001 URINALYSIS AUTO W/SCOPE: CPT

## 2024-01-29 PROCEDURE — 86592 SYPHILIS TEST NON-TREP QUAL: CPT

## 2024-01-29 PROCEDURE — 85025 COMPLETE CBC W/AUTO DIFF WBC: CPT

## 2024-01-29 PROCEDURE — 80354 DRUG SCREENING FENTANYL: CPT

## 2024-01-29 PROCEDURE — 36415 COLL VENOUS BLD VENIPUNCTURE: CPT

## 2024-01-29 PROCEDURE — 86765 RUBEOLA ANTIBODY: CPT

## 2024-01-29 PROCEDURE — 86901 BLOOD TYPING SEROLOGIC RH(D): CPT

## 2024-01-29 PROCEDURE — 86694 HERPES SIMPLEX NES ANTBDY: CPT

## 2024-01-29 PROCEDURE — 80307 DRUG TEST PRSMV CHEM ANLYZR: CPT

## 2024-01-29 PROCEDURE — 86762 RUBELLA ANTIBODY: CPT

## 2024-01-29 PROCEDURE — 59050 FETAL MONITOR W/REPORT: CPT

## 2024-01-29 PROCEDURE — 86703 HIV-1/HIV-2 1 RESULT ANTBDY: CPT

## 2024-01-29 RX ORDER — TETANUS TOXOID, REDUCED DIPHTHERIA TOXOID AND ACELLULAR PERTUSSIS VACCINE, ADSORBED 5; 2.5; 8; 8; 2.5 [IU]/.5ML; [IU]/.5ML; UG/.5ML; UG/.5ML; UG/.5ML
0.5 SUSPENSION INTRAMUSCULAR ONCE
Refills: 0 | Status: DISCONTINUED | OUTPATIENT
Start: 2024-01-29 | End: 2024-01-31

## 2024-01-29 RX ORDER — AER TRAVELER 0.5 G/1
1 SOLUTION RECTAL; TOPICAL EVERY 4 HOURS
Refills: 0 | Status: DISCONTINUED | OUTPATIENT
Start: 2024-01-29 | End: 2024-01-31

## 2024-01-29 RX ORDER — BENZOCAINE 10 %
1 GEL (GRAM) MUCOUS MEMBRANE EVERY 6 HOURS
Refills: 0 | Status: DISCONTINUED | OUTPATIENT
Start: 2024-01-29 | End: 2024-01-31

## 2024-01-29 RX ORDER — FENTANYL/BUPIVACAINE/NS/PF 2MCG/ML-.1
250 PLASTIC BAG, INJECTION (ML) INJECTION
Refills: 0 | Status: DISCONTINUED | OUTPATIENT
Start: 2024-01-29 | End: 2024-01-29

## 2024-01-29 RX ORDER — DIPHENHYDRAMINE HCL 50 MG
25 CAPSULE ORAL EVERY 6 HOURS
Refills: 0 | Status: DISCONTINUED | OUTPATIENT
Start: 2024-01-29 | End: 2024-01-31

## 2024-01-29 RX ORDER — OXYTOCIN 10 UNIT/ML
333.33 VIAL (ML) INJECTION
Qty: 20 | Refills: 0 | Status: DISCONTINUED | OUTPATIENT
Start: 2024-01-29 | End: 2024-01-29

## 2024-01-29 RX ORDER — DIBUCAINE 1 %
1 OINTMENT (GRAM) RECTAL EVERY 6 HOURS
Refills: 0 | Status: DISCONTINUED | OUTPATIENT
Start: 2024-01-29 | End: 2024-01-31

## 2024-01-29 RX ORDER — OXYTOCIN 10 UNIT/ML
333.33 VIAL (ML) INJECTION
Qty: 20 | Refills: 0 | Status: DISCONTINUED | OUTPATIENT
Start: 2024-01-29 | End: 2024-01-31

## 2024-01-29 RX ORDER — KETOROLAC TROMETHAMINE 30 MG/ML
30 SYRINGE (ML) INJECTION ONCE
Refills: 0 | Status: DISCONTINUED | OUTPATIENT
Start: 2024-01-29 | End: 2024-01-31

## 2024-01-29 RX ORDER — IBUPROFEN 200 MG
600 TABLET ORAL EVERY 6 HOURS
Refills: 0 | Status: COMPLETED | OUTPATIENT
Start: 2024-01-29 | End: 2024-12-27

## 2024-01-29 RX ORDER — HYDROCORTISONE 1 %
1 OINTMENT (GRAM) TOPICAL EVERY 6 HOURS
Refills: 0 | Status: DISCONTINUED | OUTPATIENT
Start: 2024-01-29 | End: 2024-01-31

## 2024-01-29 RX ORDER — LANOLIN
1 OINTMENT (GRAM) TOPICAL EVERY 6 HOURS
Refills: 0 | Status: DISCONTINUED | OUTPATIENT
Start: 2024-01-29 | End: 2024-01-31

## 2024-01-29 RX ORDER — OXYCODONE HYDROCHLORIDE 5 MG/1
5 TABLET ORAL ONCE
Refills: 0 | Status: DISCONTINUED | OUTPATIENT
Start: 2024-01-29 | End: 2024-01-31

## 2024-01-29 RX ORDER — SIMETHICONE 80 MG/1
80 TABLET, CHEWABLE ORAL EVERY 4 HOURS
Refills: 0 | Status: DISCONTINUED | OUTPATIENT
Start: 2024-01-29 | End: 2024-01-31

## 2024-01-29 RX ORDER — ACETAMINOPHEN 500 MG
975 TABLET ORAL
Refills: 0 | Status: DISCONTINUED | OUTPATIENT
Start: 2024-01-29 | End: 2024-01-31

## 2024-01-29 RX ORDER — SODIUM CHLORIDE 9 MG/ML
3 INJECTION INTRAMUSCULAR; INTRAVENOUS; SUBCUTANEOUS EVERY 8 HOURS
Refills: 0 | Status: DISCONTINUED | OUTPATIENT
Start: 2024-01-29 | End: 2024-01-31

## 2024-01-29 RX ORDER — SODIUM CHLORIDE 9 MG/ML
1000 INJECTION, SOLUTION INTRAVENOUS
Refills: 0 | Status: DISCONTINUED | OUTPATIENT
Start: 2024-01-29 | End: 2024-01-29

## 2024-01-29 RX ORDER — VANCOMYCIN HCL 1 G
1500 VIAL (EA) INTRAVENOUS EVERY 8 HOURS
Refills: 0 | Status: DISCONTINUED | OUTPATIENT
Start: 2024-01-29 | End: 2024-01-29

## 2024-01-29 RX ORDER — DEXAMETHASONE 0.5 MG/5ML
4 ELIXIR ORAL EVERY 6 HOURS
Refills: 0 | Status: DISCONTINUED | OUTPATIENT
Start: 2024-01-29 | End: 2024-01-29

## 2024-01-29 RX ORDER — MAGNESIUM HYDROXIDE 400 MG/1
30 TABLET, CHEWABLE ORAL
Refills: 0 | Status: DISCONTINUED | OUTPATIENT
Start: 2024-01-29 | End: 2024-01-31

## 2024-01-29 RX ORDER — NALOXONE HYDROCHLORIDE 4 MG/.1ML
0.1 SPRAY NASAL
Refills: 0 | Status: DISCONTINUED | OUTPATIENT
Start: 2024-01-29 | End: 2024-01-29

## 2024-01-29 RX ORDER — OXYCODONE HYDROCHLORIDE 5 MG/1
5 TABLET ORAL
Refills: 0 | Status: DISCONTINUED | OUTPATIENT
Start: 2024-01-29 | End: 2024-01-31

## 2024-01-29 RX ORDER — PRAMOXINE HYDROCHLORIDE 150 MG/15G
1 AEROSOL, FOAM RECTAL EVERY 4 HOURS
Refills: 0 | Status: DISCONTINUED | OUTPATIENT
Start: 2024-01-29 | End: 2024-01-31

## 2024-01-29 RX ORDER — IBUPROFEN 200 MG
600 TABLET ORAL EVERY 6 HOURS
Refills: 0 | Status: DISCONTINUED | OUTPATIENT
Start: 2024-01-29 | End: 2024-01-31

## 2024-01-29 RX ORDER — OXYTOCIN 10 UNIT/ML
2 VIAL (ML) INJECTION
Qty: 30 | Refills: 0 | Status: DISCONTINUED | OUTPATIENT
Start: 2024-01-29 | End: 2024-01-29

## 2024-01-29 RX ORDER — ONDANSETRON 8 MG/1
4 TABLET, FILM COATED ORAL EVERY 6 HOURS
Refills: 0 | Status: DISCONTINUED | OUTPATIENT
Start: 2024-01-29 | End: 2024-01-29

## 2024-01-29 RX ADMIN — Medication 300 MILLIGRAM(S): at 09:35

## 2024-01-29 RX ADMIN — Medication 2 MILLIUNIT(S)/MIN: at 10:03

## 2024-01-29 RX ADMIN — Medication 600 MILLIGRAM(S): at 23:12

## 2024-01-29 NOTE — OB RN DELIVERY SUMMARY - AS DELIV COMPLICATIONS OB
Pt states she was dx with yeast infection in May and she was given 2 doses of Diflucan which she took. Pt's symptoms resolved. Pt c/o symptoms started yesterday. White d/c. Vaginal itching inside and an odor that she was not able to describe.   Pt is req abnormal fetal heart rate tracing

## 2024-01-29 NOTE — OB PROVIDER DELIVERY SUMMARY - NSSELHIDDEN_OBGYN_ALL_OB_FT
[NS_DeliveryAttending1_OBGYN_ALL_OB_FT:[id number]=],[NS_DeliveryRN_OBGYN_ALL_OB_FT:[id number]],[NS_CirculateRN2_OBGYN_ALL_OB_FT:[id number]=]

## 2024-01-29 NOTE — OB PROVIDER LABOR PROGRESS NOTE - ASSESSMENT
28 y.o.  @ 39wks, IOL at term, GBS positive, fetal hepatomegaly and umbilical vein varix, FGR resolved (9% at 31wks). Pitocin IOL in progress. GBS prophylaxis in progress.   -continue Pitocin IOL per unit protocol  -continue GBS prophylaxis per protocol  -continue EFM and toco monitoring    Dr. Rubio PGY-4 and Dr. Fang Aware

## 2024-01-29 NOTE — OB PROVIDER H&P - ASSESSMENT
28y.o.  @ 39wks IOL at term, GBS positive, fetal hepatomegaly and umbilical vein varix, FGR resolved9% at 31wks)  Admit to L&D  IVF, labs (prenatal and TORCH sent)  Continuous efm and toco  Pain management PRN  Anticipate   Dr. Rubio/ Dr. Fang Aware

## 2024-01-29 NOTE — OB PROVIDER H&P - PRO HBSAG INFANT
Pre-charting complete. Care gaps identified will be addressed at the time of visit. and Patient has Medicare, care gaps identified cannot be performed in the clinic.   unknown

## 2024-01-29 NOTE — OB PROVIDER H&P - HISTORY OF PRESENT ILLNESS
Pt is a 28y.o.  @ 39wks presents for IOL at term. Pt denies ctx, LOF or VB and reports good FM    Sonogram this pregnancy shows fetal hepatomegaly and Umbilical vein varix

## 2024-01-29 NOTE — PROCEDURE NOTE - ADDITIONAL PROCEDURE DETAILS
Patient evaluated at bedside. Hemodynamically stable, degree of motor block appropriate for epidural medication administered. Patient is aware that the anesthesia team is available 24/7, in case she needs more pain medication or has any other anesthesia-related needs or questions.   .0625% Bupivacaine +2ucg/cc Fentanyl epidural PCEA infusion started

## 2024-01-29 NOTE — OB PROVIDER H&P - NSHPLABSRESULTS_GEN_ALL_CORE
GCT 68    Sono @ 36wks vtx, funal placenta, BPP 10/10, nl dopplers, umbilical vein varix seen  Sono @ 33.2wks S=D, EFW: 1926g, 19%, Umbilical varix seen, fetal liver appears enlarged, nl dopplers  Sono @ 31.2wks vtx, post placenta, EFW: 1468g, 8%, nl dopplers, Umbilical vein varix noted  Sono @ 22wks anatomy completed wnl  Sono @ 20.1wks S=D, nl anatomy, cardiac, hands and corpus collosum suboptimal GCT 68    Sono @ 36wks vtx, funal placenta, BPP 10/10, nl dopplers, umbilical vein varix seen  Sono @ 33.2wks S=D, EFW: 1926g, 19%, Umbilical varix seen, fetal liver appears enlarged, nl dopplers  Sono @ 31.2wks vtx, post placenta, EFW: 1468g, 8%, nl dopplers, Umbilical vein varix noted  Sono @ 22wks anatomy completed wnl  Sono @ 20.1wks S=D, nl anatomy, cardiac, hands and corpus collosum suboptimal    Fetal ECHO WNL

## 2024-01-29 NOTE — OB PROVIDER H&P - NSLOWPPHRISK_OBGYN_A_OB
No previous uterine incision/Pepper Pregnancy/Less than or equal to 4 previous vaginal births/No known bleeding disorder/No history of postpartum hemorrhage/No other PPH risks indicated

## 2024-01-29 NOTE — OB PROVIDER LABOR PROGRESS NOTE - NS_SUBJECTIVE/OBJECTIVE_OBGYN_ALL_OB_FT
Patient examined for evaluation of labor progress. Pitocin IOL currently infusing at 14 mu/min. Pt states she feels contractions but is coping well.

## 2024-01-29 NOTE — OB PROVIDER H&P - NSHPPHYSICALEXAM_GEN_ALL_CORE
T(C): --  HR: 96 (01-29-24 @ 08:48) (96 - 96)  BP: 125/86 (01-29-24 @ 08:48) (125/86 - 125/86)  RR: --  SpO2: --    Abd: gravid, soft, NT  VE: 1/0/-3/med/post  CTx: none noted  FHR: 140 moderate variability, Cat I T(C): --  HR: 96 (01-29-24 @ 08:48) (96 - 96)  BP: 125/86 (01-29-24 @ 08:48) (125/86 - 125/86)  RR: --  SpO2: --    Abd: gravid, soft, NT  VE: 1/0/-3/med/post  CTx: none noted  FHR: 140 moderate variability, Cat I  Cephalic by sono

## 2024-01-29 NOTE — OB RN DELIVERY SUMMARY - NSSELHIDDEN_OBGYN_ALL_OB_FT
[NS_DeliveryAttending1_OBGYN_ALL_OB_FT:[id number]=],[NS_DeliveryRN_OBGYN_ALL_OB_FT:[id number]=],[NS_CirculateRN2_OBGYN_ALL_OB_FT:[id number]=]

## 2024-01-30 ENCOUNTER — TRANSCRIPTION ENCOUNTER (OUTPATIENT)
Age: 29
End: 2024-01-30

## 2024-01-30 LAB
BASOPHILS # BLD AUTO: 0.03 K/UL — SIGNIFICANT CHANGE UP (ref 0–0.2)
BASOPHILS NFR BLD AUTO: 0.2 % — SIGNIFICANT CHANGE UP (ref 0–1)
EOSINOPHIL # BLD AUTO: 0.11 K/UL — SIGNIFICANT CHANGE UP (ref 0–0.7)
EOSINOPHIL NFR BLD AUTO: 0.7 % — SIGNIFICANT CHANGE UP (ref 0–8)
HCT VFR BLD CALC: 38.2 % — SIGNIFICANT CHANGE UP (ref 37–47)
HGB BLD-MCNC: 12.6 G/DL — SIGNIFICANT CHANGE UP (ref 12–16)
IMM GRANULOCYTES NFR BLD AUTO: 1 % — HIGH (ref 0.1–0.3)
LYMPHOCYTES # BLD AUTO: 17.9 % — LOW (ref 20.5–51.1)
LYMPHOCYTES # BLD AUTO: 2.77 K/UL — SIGNIFICANT CHANGE UP (ref 1.2–3.4)
MCHC RBC-ENTMCNC: 29 PG — SIGNIFICANT CHANGE UP (ref 27–31)
MCHC RBC-ENTMCNC: 33 G/DL — SIGNIFICANT CHANGE UP (ref 32–37)
MCV RBC AUTO: 88 FL — SIGNIFICANT CHANGE UP (ref 81–99)
MEV IGG SER-ACNC: 19.5 AU/ML — SIGNIFICANT CHANGE UP
MEV IGG+IGM SER-IMP: POSITIVE — SIGNIFICANT CHANGE UP
MONOCYTES # BLD AUTO: 0.66 K/UL — HIGH (ref 0.1–0.6)
MONOCYTES NFR BLD AUTO: 4.3 % — SIGNIFICANT CHANGE UP (ref 1.7–9.3)
NEUTROPHILS # BLD AUTO: 11.71 K/UL — HIGH (ref 1.4–6.5)
NEUTROPHILS NFR BLD AUTO: 75.9 % — HIGH (ref 42.2–75.2)
NRBC # BLD: 0 /100 WBCS — SIGNIFICANT CHANGE UP (ref 0–0)
PLATELET # BLD AUTO: 202 K/UL — SIGNIFICANT CHANGE UP (ref 130–400)
PMV BLD: 10.5 FL — HIGH (ref 7.4–10.4)
RBC # BLD: 4.34 M/UL — SIGNIFICANT CHANGE UP (ref 4.2–5.4)
RBC # FLD: 14 % — SIGNIFICANT CHANGE UP (ref 11.5–14.5)
WBC # BLD: 15.44 K/UL — HIGH (ref 4.8–10.8)
WBC # FLD AUTO: 15.44 K/UL — HIGH (ref 4.8–10.8)

## 2024-01-30 PROCEDURE — 99231 SBSQ HOSP IP/OBS SF/LOW 25: CPT

## 2024-01-30 RX ORDER — SIMETHICONE 80 MG/1
1 TABLET, CHEWABLE ORAL
Qty: 0 | Refills: 0 | DISCHARGE
Start: 2024-01-30

## 2024-01-30 RX ORDER — AER TRAVELER 0.5 G/1
1 SOLUTION RECTAL; TOPICAL
Qty: 0 | Refills: 0 | DISCHARGE
Start: 2024-01-30

## 2024-01-30 RX ORDER — BENZOCAINE 10 %
1 GEL (GRAM) MUCOUS MEMBRANE
Qty: 0 | Refills: 0 | DISCHARGE
Start: 2024-01-30

## 2024-01-30 RX ORDER — IBUPROFEN 200 MG
1 TABLET ORAL
Qty: 0 | Refills: 0 | DISCHARGE
Start: 2024-01-30

## 2024-01-30 RX ORDER — ACETAMINOPHEN 500 MG
3 TABLET ORAL
Qty: 0 | Refills: 0 | DISCHARGE
Start: 2024-01-30

## 2024-01-30 RX ORDER — MAGNESIUM HYDROXIDE 400 MG/1
30 TABLET, CHEWABLE ORAL
Qty: 0 | Refills: 0 | DISCHARGE
Start: 2024-01-30

## 2024-01-30 RX ADMIN — Medication 975 MILLIGRAM(S): at 08:23

## 2024-01-30 RX ADMIN — Medication 600 MILLIGRAM(S): at 23:22

## 2024-01-30 RX ADMIN — Medication 600 MILLIGRAM(S): at 18:37

## 2024-01-30 RX ADMIN — SODIUM CHLORIDE 3 MILLILITER(S): 9 INJECTION INTRAMUSCULAR; INTRAVENOUS; SUBCUTANEOUS at 06:48

## 2024-01-30 RX ADMIN — SODIUM CHLORIDE 3 MILLILITER(S): 9 INJECTION INTRAMUSCULAR; INTRAVENOUS; SUBCUTANEOUS at 18:33

## 2024-01-30 NOTE — DISCHARGE NOTE OB - CARE PROVIDER_API CALL
Shane Fang  Obstetrics and Gynecology  [address] [place] [address]  Phone: [phone]  Fax: [phone]  Follow Up Time:

## 2024-01-30 NOTE — PROGRESS NOTE ADULT - ASSESSMENT
28 y.o.  @ 39wks, IOL at term, GBS positive, fetal hepatomegaly and umbilical vein varix, FGR resolved (9% at 31wks), s/p epidural and AROM, FSE in place, on pitocin, in labor progressing well.    -Continue current management   -Pain management prn  -Continuous EFM/toco  -Reevaluate      aware  
A/P: 29yo now P3 S/P  PPD1, recovering well     - encourage ambulation, PO hydration  - monitor vitals, bleeding  - f/u AM CBC   - routine postpartum course  - anticipate d/c home

## 2024-01-30 NOTE — DISCHARGE NOTE OB - PATIENT PORTAL LINK FT
You can access the FollowMyHealth Patient Portal offered by  by registering at the following website: [email or web address]. By joining [facility]’s FollowMyHealth portal, you will also be able to view your health information using other applications (apps) compatible with our system.

## 2024-01-30 NOTE — DISCHARGE NOTE OB - NS MD DC FALL RISK RISK
For information on Fall & Injury Prevention, visit: [email or web address] OR  [email or web address] OR  https://www.cdc.gov/steadi/patient.html

## 2024-01-31 VITALS
HEART RATE: 93 BPM | TEMPERATURE: 97 F | DIASTOLIC BLOOD PRESSURE: 83 MMHG | SYSTOLIC BLOOD PRESSURE: 143 MMHG | RESPIRATION RATE: 18 BRPM

## 2024-01-31 PROCEDURE — 99238 HOSP IP/OBS DSCHRG MGMT 30/<: CPT

## 2024-01-31 RX ADMIN — Medication 600 MILLIGRAM(S): at 11:42

## 2024-01-31 RX ADMIN — Medication 600 MILLIGRAM(S): at 12:10

## 2024-01-31 NOTE — PROGRESS NOTE ADULT - SUBJECTIVE AND OBJECTIVE BOX
EMMA BEDOLLA  29yo  Female    PGY2 Note:  Pt is a 29 yo PPD#1. Pt is recovering well, no acute complaints. Pt states her pain is well controlled. Pt denies fever, chills, nausea, vomiting, SOB, chest pain, extremity swelling or pain. She is ambulating, voiding, tolerating PO< breast feeding, passing flatus.    MEDICATIONS  (STANDING):  acetaminophen     Tablet .. 975 milliGRAM(s) Oral <User Schedule>  ibuprofen  Tablet. 600 milliGRAM(s) Oral every 6 hours  ketorolac   Injectable 30 milliGRAM(s) IV Push once  prenatal multivitamin 1 Tablet(s) Oral daily    MEDICATIONS  (PRN):  benzocaine 20%/menthol 0.5% Spray 1 Spray(s) Topical every 6 hours PRN for Perineal discomfort  dibucaine 1% Ointment 1 Application(s) Topical every 6 hours PRN Perineal discomfort  diphenhydrAMINE 25 milliGRAM(s) Oral every 6 hours PRN Pruritus  hydrocortisone 1% Cream 1 Application(s) Topical every 6 hours PRN Moderate Pain (4-6)  lanolin Ointment 1 Application(s) Topical every 6 hours PRN nipple soreness  magnesium hydroxide Suspension 30 milliLiter(s) Oral two times a day PRN Constipation  oxyCODONE    IR 5 milliGRAM(s) Oral every 3 hours PRN Moderate to Severe Pain (4-10)  oxyCODONE    IR 5 milliGRAM(s) Oral once PRN Moderate to Severe Pain (4-10)  pramoxine 1%/zinc 5% Cream 1 Application(s) Topical every 4 hours PRN Moderate Pain (4-6)  simethicone 80 milliGRAM(s) Chew every 4 hours PRN Gas  witch hazel Pads 1 Application(s) Topical every 4 hours PRN Perineal discomfort    PAST MEDICAL & SURGICAL HISTORY:  No pertinent past medical history  No significant past surgical history    Physical Exam  Vital Signs Last 24 Hrs  T(C): 36.9 (29 Jan 2024 23:07), Max: 36.9 (29 Jan 2024 23:07)  T(F): 98.5 (29 Jan 2024 23:07), Max: 98.5 (29 Jan 2024 23:07)  HR: 82 (29 Jan 2024 23:07) (71 - 101)  BP: 132/68 (29 Jan 2024 23:07) (100/53 - 140/88)  RR: 18 (29 Jan 2024 23:07) (18 - 18)  SpO2: 92% (29 Jan 2024 15:33) (92% - 97%)    Gen: AAOx3, NAD  Fundus: firm, below umbilicus   Abd: Soft, nontender, nondistended, BS+  Lochia: minimal  Ext: No calf tenderness, no swelling    Labs:                        13.4   10.59 )-----------( 202      ( 29 Jan 2024 09:28 )             39.1 
PGY4 Note    Patient seen at bedside for labor progression by attending , Dr. Fang. No complaints at the moment.    T(F): 98 ( @ 09:07), Max: 98 ( @ 09:07)  HR: 89 ( @ 15:37)  BP: 122/76 ( @ 15:37) (100/53 - 140/77)  RR: 18 ( @ 09:07)    EFM: 135/mod olivia/+accels/recurrent variables  TOCO: q3min   SVE: 7/80/-2/vtx/ruptured, FSE placed     Medications:  oxytocin Infusion.: 2 ( @ 09:48), 8mu/min   vancomycin  IVPB: 300 ( @ 09:35)      Labs:                        13.4   10.59 )-----------( 202      ( 2024 09:28 )             39.1           Prenatal Syphilis Test: Nonreact ( @ 09:28)  Rapid HIV-1/2 Antibody: Nonreact ( @ 09:28)  Antibody Screen: NEG (24 @ 09:28)    Urinalysis Basic - ( 2024 09:29 )    Color: Yellow / Appearance: Clear / S.008 / pH: x  Gluc: x / Ketone: Negative mg/dL  / Bili: Negative / Urobili: 0.2 mg/dL   Blood: x / Protein: Negative mg/dL / Nitrite: Negative   Leuk Esterase: Trace / RBC: 0 /HPF / WBC 2 /HPF   Sq Epi: x / Non Sq Epi: 15 /HPF / Bacteria: Negative /HPF        L&amp;D Drug Screen, Urine: Done (24 @ 09:29)    
Subjective:   Patient doing well. No complaints. Minimal lochia. Pain controlled.    Objective:   Vital Signs Last 24 Hrs  T(C): 36.1 (2024 08:10), Max: 36.8 (2024 23:14)  T(F): 97 (2024 08:10), Max: 98.3 (2024 23:14)  HR: 93 (2024 08:10) (93 - 99)  BP: 143/83 (2024 08:10) (136/70 - 143/83)  BP(mean): --  RR: 18 (2024 08:10) (18 - 18)  SpO2: --      Gen: NAD  Abd: Soft, Nontender, Nondistended, Fundus firm below the umbilicus    Labs:                        12.6   15.44 )-----------( 202      ( 2024 09:05 )             38.2       Medications:  acetaminophen     Tablet .. 975 milliGRAM(s) Oral <User Schedule>  benzocaine 20%/menthol 0.5% Spray 1 Spray(s) Topical every 6 hours PRN  dibucaine 1% Ointment 1 Application(s) Topical every 6 hours PRN  diphenhydrAMINE 25 milliGRAM(s) Oral every 6 hours PRN  diphtheria/tetanus/pertussis (acellular) Vaccine (Adacel) 0.5 milliLiter(s) IntraMuscular once  hydrocortisone 1% Cream 1 Application(s) Topical every 6 hours PRN  ibuprofen  Tablet. 600 milliGRAM(s) Oral every 6 hours  ketorolac   Injectable 30 milliGRAM(s) IV Push once  lanolin Ointment 1 Application(s) Topical every 6 hours PRN  magnesium hydroxide Suspension 30 milliLiter(s) Oral two times a day PRN  oxyCODONE    IR 5 milliGRAM(s) Oral every 3 hours PRN  oxyCODONE    IR 5 milliGRAM(s) Oral once PRN  oxytocin Infusion 333.333 milliUNIT(s)/Min IV Continuous <Continuous>  pramoxine 1%/zinc 5% Cream 1 Application(s) Topical every 4 hours PRN  prenatal multivitamin 1 Tablet(s) Oral daily  simethicone 80 milliGRAM(s) Chew every 4 hours PRN  sodium chloride 0.9% lock flush 3 milliLiter(s) IV Push every 8 hours  witch hazel Pads 1 Application(s) Topical every 4 hours PRN      Diet: regular  Passed flatus  Breastfeeding    Assessment:   27yo P3, s/p , PPD 2 recovering well   Plan:  Routine postpartum care  Encouraged ambulation and PO hydration  Tolerating regular diet  d/c home today
fhr 140s gbtbv  ctx q 2-3 min  sve 4/50/-2  arom; blood tinged fluid  will request epidural

## 2024-02-01 LAB
CMV IGM SERPL-ACNC: <8 AU/ML — SIGNIFICANT CHANGE UP
HBV SURFACE AG SERPL QL IA: SIGNIFICANT CHANGE UP
HSV 1+2 IGM SER-IMP: 1.46 IV — HIGH
MEV IGM SER-ACNC: 13.4 AU/ML — SIGNIFICANT CHANGE UP
T GONDII IGM SER IA-ACNC: <3 AU/ML — SIGNIFICANT CHANGE UP

## 2024-02-06 DIAGNOSIS — Z3A.39 39 WEEKS GESTATION OF PREGNANCY: ICD-10-CM

## 2024-02-06 DIAGNOSIS — Z88.1 ALLERGY STATUS TO OTHER ANTIBIOTIC AGENTS STATUS: ICD-10-CM

## 2024-02-06 DIAGNOSIS — Z28.09 IMMUNIZATION NOT CARRIED OUT BECAUSE OF OTHER CONTRAINDICATION: ICD-10-CM

## 2024-02-06 DIAGNOSIS — Z28.21 IMMUNIZATION NOT CARRIED OUT BECAUSE OF PATIENT REFUSAL: ICD-10-CM

## 2024-03-20 ENCOUNTER — APPOINTMENT (OUTPATIENT)
Dept: OBGYN | Facility: CLINIC | Age: 29
End: 2024-03-20
Payer: MEDICAID

## 2024-03-20 NOTE — HISTORY OF PRESENT ILLNESS
[FreeTextEntry1] : ---- 27 Y/O  HERE FOR PP EXAM;   24; BOY; NURSING PMHX;/ PSHX;/ SOCIAL HX;-ETOH -CIG[employer]  STD;   /        STD PREVENTION DISCUSSED FAMILY HISTORY OF BREAST CANCER REVIEW OF SYMPTOMS DONE; ALLERGIES; pt answered NKDA Medication reconciliation was completed by reviewing, with the patient's involvement, the patient's current outpatient medications and those  ordered for the patient today.           PE; ABDOMEN;SOFT NT ND NL GENITALIA VAGINA -DC CERVIX;-CMT UTERUS; NL SIZE NT AV ADNEXA; NT - MASSES  A;P;PP EXAM -PAP GC CHLAMYDIA -CONTRACEPTION DISCUSSED -F-U PRN.

## 2025-04-30 ENCOUNTER — APPOINTMENT (OUTPATIENT)
Dept: OBGYN | Facility: CLINIC | Age: 30
End: 2025-04-30
Payer: MEDICAID

## 2025-04-30 DIAGNOSIS — N92.6 IRREGULAR MENSTRUATION, UNSPECIFIED: ICD-10-CM

## 2025-04-30 PROCEDURE — 99214 OFFICE O/P EST MOD 30 MIN: CPT | Mod: 25

## 2025-06-18 ENCOUNTER — APPOINTMENT (OUTPATIENT)
Dept: OBGYN | Facility: CLINIC | Age: 30
End: 2025-06-18
Payer: MEDICAID

## 2025-06-18 PROBLEM — Z01.419 WOMEN'S ANNUAL ROUTINE GYNECOLOGICAL EXAMINATION: Status: ACTIVE | Noted: 2025-06-18

## 2025-06-18 PROBLEM — N91.2 AMENORRHEA: Status: ACTIVE | Noted: 2023-06-07

## 2025-06-18 PROBLEM — Z34.90 PREGNANCY: Status: ACTIVE | Noted: 2025-06-18

## 2025-06-18 PROCEDURE — 99214 OFFICE O/P EST MOD 30 MIN: CPT | Mod: 25

## 2025-06-18 PROCEDURE — 99459 PELVIC EXAMINATION: CPT

## 2025-06-19 LAB
ABORH: NORMAL
BASOPHILS # BLD AUTO: 0.04 K/UL
BASOPHILS NFR BLD AUTO: 0.4 %
EOSINOPHIL # BLD AUTO: 0.06 K/UL
EOSINOPHIL NFR BLD AUTO: 0.6 %
HCG SERPL-MCNC: ABNORMAL MIU/ML
HCT VFR BLD CALC: 41.7 %
HGB A MFR BLD: 97 %
HGB A2 MFR BLD: 3 %
HGB BLD-MCNC: 12.7 G/DL
HGB FRACT BLD-IMP: NORMAL
HIV1+2 AB SPEC QL IA.RAPID: NONREACTIVE
IMM GRANULOCYTES NFR BLD AUTO: 0.3 %
LYMPHOCYTES # BLD AUTO: 2.52 K/UL
LYMPHOCYTES NFR BLD AUTO: 25.8 %
MAN DIFF?: NORMAL
MCHC RBC-ENTMCNC: 27.1 PG
MCHC RBC-ENTMCNC: 30.5 G/DL
MCV RBC AUTO: 88.9 FL
MONOCYTES # BLD AUTO: 0.65 K/UL
MONOCYTES NFR BLD AUTO: 6.6 %
NEUTROPHILS # BLD AUTO: 6.48 K/UL
NEUTROPHILS NFR BLD AUTO: 66.3 %
PLATELET # BLD AUTO: 272 K/UL
PROGEST SERPL-MCNC: 11 NG/ML
RBC # BLD: 4.69 M/UL
RBC # FLD: 14.3 %
WBC # FLD AUTO: 9.78 K/UL

## 2025-06-23 ENCOUNTER — NON-APPOINTMENT (OUTPATIENT)
Age: 30
End: 2025-06-23

## 2025-06-23 LAB
B19V IGG SER QL IA: 0.42 INDEX
B19V IGG+IGM SER-IMP: NEGATIVE
B19V IGG+IGM SER-IMP: NORMAL
B19V IGM FLD-ACNC: 0.26 INDEX
B19V IGM SER-ACNC: NEGATIVE
ENA RNP AB SER-ACNC: 0.4 AL
ENA SM AB SER-ACNC: <0.2 AL
HBV SURFACE AG SER QL: NONREACTIVE
HCV AB SER QL: NONREACTIVE
HCV S/CO RATIO: 0.24 S/CO
MEV IGG FLD QL IA: 24.8 AU/ML
MEV IGG+IGM SER-IMP: POSITIVE
RUBV IGG FLD-ACNC: 0.58 INDEX
RUBV IGG SER-IMP: NEGATIVE
T PALLIDUM AB SER QL IA: NEGATIVE
VZV AB TITR SER: POSITIVE
VZV IGG SER IF-ACNC: 6.98 S/CO

## 2025-06-26 LAB
C TRACH RRNA SPEC QL NAA+PROBE: NOT DETECTED
N GONORRHOEA RRNA SPEC QL NAA+PROBE: NOT DETECTED
SOURCE TP AMPLIFICATION: NORMAL

## 2025-09-03 ENCOUNTER — APPOINTMENT (OUTPATIENT)
Dept: OBGYN | Facility: CLINIC | Age: 30
End: 2025-09-03
Payer: MEDICAID

## 2025-09-03 PROCEDURE — 99213 OFFICE O/P EST LOW 20 MIN: CPT | Mod: TH
